# Patient Record
Sex: MALE | Race: WHITE | HISPANIC OR LATINO | URBAN - METROPOLITAN AREA
[De-identification: names, ages, dates, MRNs, and addresses within clinical notes are randomized per-mention and may not be internally consistent; named-entity substitution may affect disease eponyms.]

---

## 2021-07-10 ENCOUNTER — EMERGENCY (EMERGENCY)
Facility: HOSPITAL | Age: 21
LOS: 1 days | Discharge: HOME | End: 2021-07-10
Admitting: EMERGENCY MEDICINE
Payer: COMMERCIAL

## 2021-07-10 VITALS
HEART RATE: 73 BPM | SYSTOLIC BLOOD PRESSURE: 116 MMHG | RESPIRATION RATE: 18 BRPM | WEIGHT: 169.98 LBS | HEIGHT: 69 IN | DIASTOLIC BLOOD PRESSURE: 76 MMHG | TEMPERATURE: 99 F | OXYGEN SATURATION: 98 %

## 2021-07-10 DIAGNOSIS — Z79.82 LONG TERM (CURRENT) USE OF ASPIRIN: ICD-10-CM

## 2021-07-10 DIAGNOSIS — Z86.73 PERSONAL HISTORY OF TRANSIENT ISCHEMIC ATTACK (TIA), AND CEREBRAL INFARCTION WITHOUT RESIDUAL DEFICITS: ICD-10-CM

## 2021-07-10 DIAGNOSIS — E78.5 HYPERLIPIDEMIA, UNSPECIFIED: ICD-10-CM

## 2021-07-10 DIAGNOSIS — R51.9 HEADACHE, UNSPECIFIED: ICD-10-CM

## 2021-07-10 DIAGNOSIS — S09.90XA UNSPECIFIED INJURY OF HEAD, INITIAL ENCOUNTER: ICD-10-CM

## 2021-07-10 DIAGNOSIS — G93.0 CEREBRAL CYSTS: ICD-10-CM

## 2021-07-10 PROCEDURE — 70450 CT HEAD/BRAIN W/O DYE: CPT | Mod: 26

## 2021-07-10 PROCEDURE — 99284 EMERGENCY DEPT VISIT MOD MDM: CPT

## 2021-07-10 NOTE — ED PROVIDER NOTE - PATIENT PORTAL LINK FT
You can access the FollowMyHealth Patient Portal offered by [facility] by registering at the following website: [email or web address]. By joining [employer]’s FollowMyHealth portal, you will also be able to view your health information using other applications (apps) compatible with our system.

## 2021-07-10 NOTE — ED PROVIDER NOTE - CLINICAL SUMMARY MEDICAL DECISION MAKING FREE TEXT BOX
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 will do CT head and continue to monitor PMHX thrombotic CVA on ASA,  acrachnoid cyst presents with persistant h/a and nausea after head trauma yesterday. will do CT head and continue to monitor

## 2021-07-10 NOTE — ED PROVIDER NOTE - OBJECTIVE STATEMENT
PMHX thrombotic CVA 7 months ago on ASA, hyperlipidemia, craniotomy with cyst fenistration presents with persistant h/a and nausea after being hit on the head with a soccer ball at 1pm yesterday. denies LOC, fall, neck pain, changes in MS. PMHX thrombotic CVA 7 months ago on ASA, hyperlipidemia, arachnoid cyst s/p  fenistration presents with persistant h/a and nausea after being hit on the head with a soccer ball at 1pm yesterday. denies LOC, fall, neck pain, changes in MS.

## 2021-07-10 NOTE — ED PROVIDER NOTE - PROGRESS NOTE DETAILS
complains of pain/discomfort
CT findings discussed with patient and mother at beside. states that findings stable since previous CT done 7 months ago. d/c with report and imaging on CD

## 2021-07-10 NOTE — ED ADULT NURSE REASSESSMENT NOTE - NS ED NURSE REASSESS COMMENT FT1
Pt received from Carmencita SAMPSON. Pt resting comfortably in chair. No s/s of acute distress. Will continue to monitor

## 2021-07-10 NOTE — ED ADULT TRIAGE NOTE - CHIEF COMPLAINT QUOTE
walk in  c/o persistent headache s/p being struck in the head with a soccer ball yesterday 1pm. +nausea. Pt hx of thrombotic stroke 7 months ago on blood thinners, went to  and was told to come to ER for CT scan.

## 2022-08-25 ENCOUNTER — EMERGENCY (EMERGENCY)
Facility: HOSPITAL | Age: 22
LOS: 1 days | Discharge: HOME | End: 2022-08-25
Attending: EMERGENCY MEDICINE | Admitting: EMERGENCY MEDICINE

## 2022-08-25 VITALS
DIASTOLIC BLOOD PRESSURE: 83 MMHG | TEMPERATURE: 98 F | HEIGHT: 69 IN | WEIGHT: 192.02 LBS | SYSTOLIC BLOOD PRESSURE: 133 MMHG | HEART RATE: 68 BPM | RESPIRATION RATE: 18 BRPM | OXYGEN SATURATION: 99 %

## 2022-08-25 PROCEDURE — 99284 EMERGENCY DEPT VISIT MOD MDM: CPT

## 2022-08-25 RX ORDER — HUMAN RABIES VIRUS IMMUNE GLOBULIN 150 [IU]/ML
1750 INJECTION, SOLUTION INTRAMUSCULAR ONCE
Refills: 0 | Status: COMPLETED | OUTPATIENT
Start: 2022-08-25 | End: 2022-08-25

## 2022-08-25 RX ORDER — TETANUS TOXOID, REDUCED DIPHTHERIA TOXOID AND ACELLULAR PERTUSSIS VACCINE, ADSORBED 5; 2.5; 8; 8; 2.5 [IU]/.5ML; [IU]/.5ML; UG/.5ML; UG/.5ML; UG/.5ML
0.5 SUSPENSION INTRAMUSCULAR ONCE
Refills: 0 | Status: COMPLETED | OUTPATIENT
Start: 2022-08-25 | End: 2022-08-25

## 2022-08-25 RX ORDER — RABIES VACC, HUMAN DIPLOID/PF 2.5 UNIT
1 VIAL (EA) INTRAMUSCULAR ONCE
Refills: 0 | Status: COMPLETED | OUTPATIENT
Start: 2022-08-25 | End: 2022-08-25

## 2022-08-25 RX ADMIN — HUMAN RABIES VIRUS IMMUNE GLOBULIN 1750 UNIT(S): 150 INJECTION, SOLUTION INTRAMUSCULAR at 21:38

## 2022-08-25 RX ADMIN — TETANUS TOXOID, REDUCED DIPHTHERIA TOXOID AND ACELLULAR PERTUSSIS VACCINE, ADSORBED 0.5 MILLILITER(S): 5; 2.5; 8; 8; 2.5 SUSPENSION INTRAMUSCULAR at 21:41

## 2022-08-25 RX ADMIN — Medication 1 MILLILITER(S): at 21:39

## 2022-08-25 NOTE — ED ADULT NURSE NOTE - CHIEF COMPLAINT QUOTE
Pt walked in c/o dog bite to L hand 3 days ago while in [place]. Went to UC and told to come to ED for evaluation. Afebrile.

## 2022-08-25 NOTE — ED ADULT TRIAGE NOTE - CHIEF COMPLAINT QUOTE
Pt walked in c/o dog bite to L hand 3 days ago while in [place]. Went to UC and told to come to ED for evaluation. Afebrile.

## 2022-08-25 NOTE — ED PROVIDER NOTE - CLINICAL SUMMARY MEDICAL DECISION MAKING FREE TEXT BOX
20yo M PMH CVA 9 mo ago on daily asa, arachnoid cyst s/p repair now presenting with bite to L hand 3 days ago in [place] by a stray dog. Will update tetanus and give rabies immunoglobulin/vaccine. Pt states he has been prescribed antibiotics by .

## 2022-08-25 NOTE — ED PROVIDER NOTE - PHYSICAL EXAMINATION
G: NAD, cooperative with exam   H: NCAT  E: EOMI   M: Mucous membranes moist   R: nWOB  C: RRR   MSK: L hand with punctate area c/f bite, no open skin, well healing, from of all joints of the hand, sensation intact

## 2022-08-25 NOTE — ED PROVIDER NOTE - NSFOLLOWUPINSTRUCTIONS_ED_ALL_ED_FT
You were seen in the emergency department for: dog bite     In the ED, you were given: Rabies prophylaxis (immunoglobulin and vaccine) and Tdap    Please take the antibiotic prescribed from the urgent care.     Please return to the emergency department for repeat rabies vaccination series.      You will need another rabies vaccine on the following dates:  August 28th, 2022 September 1st, 2022 September 8th, 2022     Please return to the Emergency Department if you experience any of the following symptoms:   - Shortness of breath or trouble breathing  - Pressure, pain or tightness in the chest  - Face drooping, arm weakness or speech difficulty  - Persistence of severe vomiting  - Head injury or loss of consciousness  - Nonstop bleeding or an open wound    (1) Follow up with your primary care physician within the next 24-48 hours as discussed. In addition, we did not find evidence of a life threatening illness on your testing here today, but listed below are the specialists that will be necessary to see as an outpatient to continue the workup.  Please call the numbers listed below or [phone] to set up the necessary appointments.  (2) Take Tylenol (up to 1000mg or 1 g)  and/or Motrin (up to 600mg) up to every 6 hours as needed for pain.    (3) Please continue taking all of your home medications as directed.

## 2022-08-25 NOTE — ED PROVIDER NOTE - PATIENT PORTAL LINK FT
You can access the FollowMyHealth Patient Portal offered by [facility] by registering at the following website: [email or web address]. By joining [employer]’s FollowMyHealth portal, you will also be able to view your health information using other applications (apps) compatible with our system.

## 2022-08-25 NOTE — ED PROVIDER NOTE - OBJECTIVE STATEMENT
22yo M PMH CVA 9 mo ago on daily asa, arachnoid cyst s/p repair now presenting with bite to L hand 3 days ago in [place] by a stray dog. Denies F/C/NS/N/V/D. Went to UC, prescribed abx and recommended to come to the ED for further eval. Does not recall last tetaus.

## 2022-08-28 ENCOUNTER — EMERGENCY (EMERGENCY)
Facility: HOSPITAL | Age: 22
LOS: 1 days | Discharge: HOME | End: 2022-08-28
Attending: EMERGENCY MEDICINE | Admitting: EMERGENCY MEDICINE

## 2022-08-28 VITALS
HEIGHT: 69 IN | WEIGHT: 180.78 LBS | SYSTOLIC BLOOD PRESSURE: 116 MMHG | RESPIRATION RATE: 17 BRPM | TEMPERATURE: 98 F | OXYGEN SATURATION: 97 % | DIASTOLIC BLOOD PRESSURE: 72 MMHG | HEART RATE: 97 BPM

## 2022-08-28 PROCEDURE — 99283 EMERGENCY DEPT VISIT LOW MDM: CPT

## 2022-08-28 RX ORDER — RABIES VACC, HUMAN DIPLOID/PF 2.5 UNIT
1 VIAL (EA) INTRAMUSCULAR ONCE
Refills: 0 | Status: COMPLETED | OUTPATIENT
Start: 2022-08-28 | End: 2022-08-28

## 2022-08-28 RX ADMIN — Medication 1 MILLILITER(S): at 16:08

## 2022-08-28 NOTE — ED PROVIDER NOTE - NSFOLLOWUPINSTRUCTIONS_ED_ALL_ED_FT
1. What is rabies?    Rabies is a serious disease. It is caused by a virus.  Rabies is mainly a disease of animals. Humans get rabies when they are bitten by infected animals.  At first there might not be any symptoms. But weeks, or even months after a bite, rabies can cause pain, fatigue, headaches, fever, and irritability. These are followed by seizures, hallucinations, and paralysis. Human rabies is almost always fatal.  Wild animals—especially bats—are the most common source of human rabies infection in the [place].   Skunks, raccoons, dogs, cats, coyotes, foxes and other mammals can also transmit the disease.  Human rabies is rare in the [place]. There have been only 55 cases diagnosed since 1990.   However, between 16,000 and 39,000 people are vaccinated each year as a precaution after animal bites. Also, rabies is far more common in other parts of the world, with about 40,000–70,000 rabies-related deaths worldwide each year. Bites from unvaccinated dogs cause most of these cases.  Rabies vaccine can prevent rabies.     2. Rabies vaccine  Rabies vaccine is given to people at high risk of rabies to protect them if they are exposed. It can also prevent the disease if it is given to a person after they have been exposed.  Rabies vaccine is made from killed rabies virus. It cannot cause rabies.    3. Who should get rabies vaccine and when?  Preventive vaccination (no exposure)     People at high risk of exposure to rabies, such as veterinarians, animal handlers, rabies laboratory workers, spelunkers, and rabies biologics production workers should be offered rabies vaccine.The vaccine should also be considered for:  People whose activities bring them into frequent contact with rabies virus or with possibly rabid animals.International travelers who are likely to come in contact with animals in parts of the world where rabies is common.The pre-exposure schedule for rabies vaccination is 3 doses, given at the following times:    Dose 1: As appropriateDose 2: 7 days after Dose 1Dose 3: 21 days or 28 days after Dose 1For laboratory workers and others who may be repeatedly exposed to rabies virus, periodic testing for immunity is recommended, and booster doses should be given as needed. (Testing or booster doses are not recommended for travelers.) Ask your doctor for details.    Vaccination after an exposure    Anyone who has been bitten by an animal, or who otherwise may have been exposed to rabies, should clean the wound and see a doctor immediately. The doctor will determine if they need to be vaccinated.  A person who is exposed and has never been vaccinated against rabies should get 4 doses of rabies vaccine—one dose right away and additional doses on the 3rd, 7th, and 14th days. They should also get another shot called Rabies Immune Globulin at the same time as the first dose.  A person who has been previously vaccinated should get 2 doses of rabies vaccine—one right away and another on the 3rd day. Rabies Immune Globulin is not needed.    4. Tell your doctor if...  Talk with a doctor before getting rabies vaccine if you:  ever had a serious (life-threatening) allergic reaction to a previous dose of rabies vaccine, or to any component of the vaccine; tell your doctor if you have any severe allergies,  have a weakened immune system because of:  HIV/AIDS, or another disease that affects the immune system,treatment with drugs that affect the immune system, such as steroids,cancer, or cancer treatment with radiation or drugs.If you have a minor illness, such as a cold, you can be vaccinated. If you are moderately or severely ill, you should probably wait until you recover before getting a routine (non-exposure) dose of rabies vaccine.  If you have been exposed to rabies virus, you should get the vaccine regardless of any other illnesses you may have.    5. What are the risks from rabies vaccine?  A vaccine, like any medicine, is capable of causing serious problems, such as severe allergic reactions. The risk of a vaccine causing serious harm, or death, is extremely small. Serious problems from rabies vaccine are very rare.  Mild problems : Soreness, redness, swelling, or itching where the shot was given (30%–74%)headache, nausea, abdominal pain, muscle aches, dizziness (5%–40%)    Moderate problems : Hives, pain in the joints, fever (about 6% of booster doses)Other nervous system disorders, such as Guillain–Barré syndrome (GBS), have been reported after rabies vaccine, but this happens so rarely that it is not known whether they are related to the vaccine.    NOTE: Several brands of rabies vaccine are available in the [place], and reactions may vary between brands. Your provider can give you more information about a particular brand.    6. What if there is a serious reaction?  What should I look for?     Look for anything that concerns you, such as signs of a severe allergic reaction, very high fever, or behavior changes.Signs of a severe allergic reaction can include hives, swelling of the face and throat, difficulty breathing, a fast heartbeat, dizziness, and weakness. These would start a few minutes to a few hours after the vaccination.    What should I do?   If you think it is a severe allergic reaction or other emergency that can't wait, call 9-1-1 or get the person to the nearest hospital. Otherwise, call your doctor.Afterward, the reaction should be reported to the Vaccine Adverse Event Reporting System (VAERS). Your doctor might file this report, or you can do it yourself through the VAERS web site at www.vaers.hhs.gov, or by calling 1-966.224.3909.

## 2022-08-28 NOTE — ED PROVIDER NOTE - PATIENT PORTAL LINK FT
You can access the FollowMyHealth Patient Portal offered by [facility] by registering at the following website: [email or web address]. By joining [employer]’s FollowMyHealth portal, you will also be able to view your health information using other applications (apps) compatible with our system.

## 2022-08-28 NOTE — ED PROVIDER NOTE - OBJECTIVE STATEMENT
20 y/o M presenting for second dose of a rabies shot. He has no complaints or symptoms at this time.

## 2022-08-29 DIAGNOSIS — Z20.3 CONTACT WITH AND (SUSPECTED) EXPOSURE TO RABIES: ICD-10-CM

## 2022-08-29 DIAGNOSIS — Z86.73 PERSONAL HISTORY OF TRANSIENT ISCHEMIC ATTACK (TIA), AND CEREBRAL INFARCTION WITHOUT RESIDUAL DEFICITS: ICD-10-CM

## 2022-08-29 DIAGNOSIS — S60.512A ABRASION OF LEFT HAND, INITIAL ENCOUNTER: ICD-10-CM
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
## 2022-08-29 DIAGNOSIS — Z23 ENCOUNTER FOR IMMUNIZATION: ICD-10-CM

## 2022-08-29 DIAGNOSIS — S61.452A OPEN BITE OF LEFT HAND, INITIAL ENCOUNTER: ICD-10-CM
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
## 2022-08-29 DIAGNOSIS — Z29.14 ENCOUNTER FOR PROPHYLACTIC RABIES IMMUNE GLOBIN: ICD-10-CM

## 2022-09-01 ENCOUNTER — EMERGENCY (EMERGENCY)
Facility: HOSPITAL | Age: 22
LOS: 1 days | Discharge: HOME | End: 2022-09-01
Admitting: EMERGENCY MEDICINE

## 2022-09-01 VITALS
HEART RATE: 98 BPM | RESPIRATION RATE: 16 BRPM | SYSTOLIC BLOOD PRESSURE: 121 MMHG | TEMPERATURE: 98 F | DIASTOLIC BLOOD PRESSURE: 73 MMHG | HEIGHT: 69 IN | OXYGEN SATURATION: 98 % | WEIGHT: 190.04 LBS

## 2022-09-01 DIAGNOSIS — Z29.14 ENCOUNTER FOR PROPHYLACTIC RABIES IMMUNE GLOBIN: ICD-10-CM

## 2022-09-01 DIAGNOSIS — Z23 ENCOUNTER FOR IMMUNIZATION: ICD-10-CM

## 2022-09-01 PROCEDURE — 99283 EMERGENCY DEPT VISIT LOW MDM: CPT

## 2022-09-01 RX ORDER — RABIES VACC, HUMAN DIPLOID/PF 2.5 UNIT
1 VIAL (EA) INTRAMUSCULAR ONCE
Refills: 0 | Status: COMPLETED | OUTPATIENT
Start: 2022-09-01 | End: 2022-09-01

## 2022-09-01 RX ADMIN — Medication 1 MILLILITER(S): at 18:03

## 2022-09-01 NOTE — ED PROVIDER NOTE - PATIENT PORTAL LINK FT
You can access the FollowMyHealth Patient Portal offered by [facility] by registering at the following website: [email or web address]. By joining [employer]’s FollowMyHealth portal, you will also be able to view your health information using other applications (apps) compatible with our system.

## 2022-09-01 NOTE — ED PROVIDER NOTE - OBJECTIVE STATEMENT
20 y/o male presents to the ED for his third dose of the rabies vaccine. He has no complaints or issues at this time.

## 2022-09-01 NOTE — ED PROVIDER NOTE - CLINICAL SUMMARY MEDICAL DECISION MAKING FREE TEXT BOX
22 y/o male presents to the ED for his third dose of the rabies vaccine. Pt has no complaints and understands that he must return in 14 days for his final vaccine. Pt tolerated the procedure well and is stable on discharge.

## 2022-09-08 ENCOUNTER — EMERGENCY (EMERGENCY)
Facility: HOSPITAL | Age: 22
LOS: 1 days | Discharge: HOME | End: 2022-09-08
Attending: EMERGENCY MEDICINE | Admitting: EMERGENCY MEDICINE

## 2022-09-08 VITALS
HEART RATE: 85 BPM | DIASTOLIC BLOOD PRESSURE: 73 MMHG | RESPIRATION RATE: 18 BRPM | SYSTOLIC BLOOD PRESSURE: 112 MMHG | OXYGEN SATURATION: 97 % | TEMPERATURE: 98 F | HEIGHT: 69 IN

## 2022-09-08 DIAGNOSIS — Z23 ENCOUNTER FOR IMMUNIZATION: ICD-10-CM

## 2022-09-08 DIAGNOSIS — S61.452D OPEN BITE OF LEFT HAND, SUBSEQUENT ENCOUNTER: ICD-10-CM
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
## 2022-09-08 PROCEDURE — 99283 EMERGENCY DEPT VISIT LOW MDM: CPT

## 2022-09-08 RX ORDER — RABIES VACC, HUMAN DIPLOID/PF 2.5 UNIT
1 VIAL (EA) INTRAMUSCULAR ONCE
Refills: 0 | Status: COMPLETED | OUTPATIENT
Start: 2022-09-08 | End: 2022-09-08

## 2022-09-08 RX ADMIN — Medication 1 MILLILITER(S): at 11:25

## 2022-09-08 NOTE — ED PROVIDER NOTE - CLINICAL SUMMARY MEDICAL DECISION MAKING FREE TEXT BOX
No e/o wound infection and appears to have completely healed.  Will give final rabies vaccine.  All questions answered.

## 2022-09-08 NOTE — ED ADULT NURSE NOTE - OBJECTIVE STATEMENT
Patient presents to ED for final rabies vaccination post exposure. Denies pain, fever chill, or any discomfort.

## 2022-09-08 NOTE — ED PROVIDER NOTE - NSFOLLOWUPINSTRUCTIONS_ED_ALL_ED_FT
YOU HAVE COMPLETED YOUR FINAL RABIES VACCINE.  YOU WILL HAVE FULL IMMUNITY FOR THE NEAR FUTURE BUT IF YOU EXPERIENCE A NEW EXPOSURE PLEASE STILL SEEK MEDICAL ATTENTION AS A BOOSTER MAY BE NEEDED DEPENDING ON THE SITUATION.

## 2022-09-08 NOTE — ED PROVIDER NOTE - OBJECTIVE STATEMENT
Pt is a 22yo M who is here for his 4th and final rabies vaccine.  Pt was here previously for a dog bite to his L hand.  Wound has completely healed and there is no redness, swelling, fevers, or other concerns.  Denies issues with vaccine.  No other complaints.

## 2022-09-08 NOTE — ED PROVIDER NOTE - PATIENT PORTAL LINK FT
You can access the FollowMyHealth Patient Portal offered by [facility] by registering at the following website: [email or web address]. By joining [employer]’s FollowMyHealth portal, you will also be able to view your health information using other applications (apps) compatible with our system.

## 2022-09-08 NOTE — ED PROVIDER NOTE - PHYSICAL EXAMINATION
CONSTITUTIONAL: Well appearing, well nourished, awake, alert, oriented to person, place, time/situation and in no apparent distress.  ENT: Airway patent, Nasal mucosa clear. Mouth with normal mucosa.  EYES: Clear bilaterally.  RESPIRATORY: Breathing comfortably with normal RR.  MSK: Range of motion is not limited, no deformities noted.  NEURO: Alert and oriented, no focal deficits.  SKIN: Skin normal color, warm, dry and intact. No evidence of rash, wounds, lacs, or abrasions.   PSYCH: Alert and oriented to person, place, time/situation. normal mood and affect. no apparent risk to self or others.

## 2024-09-20 NOTE — ED PROVIDER NOTE - NSTIMEPROVIDERCAREINITIATE_GEN_ER
FUTURE VISIT INFORMATION      FUTURE VISIT INFORMATION:  Date:  10/8/24  Time: 9:20am  Location: [facility]  REFERRAL INFORMATION:  Referring provider:  Roe Charles MD  Referring providers clinic:  [facility]   Reason for visit/diagnosis  Nasal polyposis [J33.9]  REF BY Roe Charles MD  RECS IN [facility]  CONF LOC  SCHED W/ MOM AND      RECORDS REQUESTED FROM:       Clinic name Comments Records Status Imaging Status   [facility]  9/19/24, 3/14/24- OV w. Roe Charles MD [facility]  7/18/24, 6/20/24- OV w. Multiple providers     [facility]     Imaging  10/2/23- CT Sinus   1/28/23- CT Sinus   11/11/17- Ct Sinus   3/3/15- C Temporal  Epic  Pacs            
28-Aug-2022 15:21